# Patient Record
Sex: MALE | Race: WHITE | NOT HISPANIC OR LATINO | Employment: OTHER | URBAN - METROPOLITAN AREA
[De-identification: names, ages, dates, MRNs, and addresses within clinical notes are randomized per-mention and may not be internally consistent; named-entity substitution may affect disease eponyms.]

---

## 2022-01-01 ENCOUNTER — PATIENT OUTREACH (OUTPATIENT)
Dept: ONCOLOGY | Facility: CLINIC | Age: 82
End: 2022-01-01

## 2022-01-01 ENCOUNTER — PATIENT OUTREACH (OUTPATIENT)
Dept: CARE COORDINATION | Facility: CLINIC | Age: 82
End: 2022-01-01

## 2022-01-01 ENCOUNTER — ONCOLOGY VISIT (OUTPATIENT)
Dept: ONCOLOGY | Facility: CLINIC | Age: 82
End: 2022-01-01
Attending: INTERNAL MEDICINE
Payer: MEDICARE

## 2022-01-01 ENCOUNTER — ALLIED HEALTH/NURSE VISIT (OUTPATIENT)
Dept: ONCOLOGY | Facility: CLINIC | Age: 82
End: 2022-01-01

## 2022-01-01 ENCOUNTER — TRANSCRIBE ORDERS (OUTPATIENT)
Dept: ONCOLOGY | Facility: CLINIC | Age: 82
End: 2022-01-01

## 2022-01-01 ENCOUNTER — LAB (OUTPATIENT)
Dept: LAB | Facility: CLINIC | Age: 82
End: 2022-01-01
Attending: INTERNAL MEDICINE
Payer: MEDICARE

## 2022-01-01 ENCOUNTER — HEALTH MAINTENANCE LETTER (OUTPATIENT)
Age: 82
End: 2022-01-01

## 2022-01-01 ENCOUNTER — PRE VISIT (OUTPATIENT)
Dept: ONCOLOGY | Facility: CLINIC | Age: 82
End: 2022-01-01

## 2022-01-01 ENCOUNTER — LAB REQUISITION (OUTPATIENT)
Dept: LAB | Facility: CLINIC | Age: 82
End: 2022-01-01
Payer: MEDICARE

## 2022-01-01 VITALS
TEMPERATURE: 98 F | WEIGHT: 188.8 LBS | OXYGEN SATURATION: 96 % | DIASTOLIC BLOOD PRESSURE: 71 MMHG | HEIGHT: 70 IN | BODY MASS INDEX: 27.03 KG/M2 | RESPIRATION RATE: 16 BRPM | HEART RATE: 70 BPM | SYSTOLIC BLOOD PRESSURE: 112 MMHG

## 2022-01-01 DIAGNOSIS — C61 PROSTATE CA (H): Primary | ICD-10-CM

## 2022-01-01 DIAGNOSIS — C61 PROSTATE CANCER (H): Primary | ICD-10-CM

## 2022-01-01 DIAGNOSIS — C61 HORMONE RESISTANT PROSTATE CANCER (H): ICD-10-CM

## 2022-01-01 DIAGNOSIS — Z19.2 HORMONE RESISTANT PROSTATE CANCER (H): ICD-10-CM

## 2022-01-01 DIAGNOSIS — C79.51 PROSTATE CANCER METASTATIC TO BONE (H): ICD-10-CM

## 2022-01-01 DIAGNOSIS — C61 PROSTATE CANCER METASTATIC TO BONE (H): ICD-10-CM

## 2022-01-01 LAB
PATH REPORT.COMMENTS IMP SPEC: ABNORMAL
PATH REPORT.COMMENTS IMP SPEC: ABNORMAL
PATH REPORT.COMMENTS IMP SPEC: YES
PATH REPORT.FINAL DX SPEC: ABNORMAL
PATH REPORT.GROSS SPEC: ABNORMAL
PATH REPORT.MICROSCOPIC SPEC OTHER STN: ABNORMAL
PATH REPORT.RELEVANT HX SPEC: ABNORMAL
PATH REPORT.RELEVANT HX SPEC: ABNORMAL
PATH REPORT.SITE OF ORIGIN SPEC: ABNORMAL

## 2022-01-01 PROCEDURE — 88321 CONSLTJ&REPRT SLD PREP ELSWR: CPT | Performed by: PATHOLOGY

## 2022-01-01 PROCEDURE — G0463 HOSPITAL OUTPT CLINIC VISIT: HCPCS

## 2022-01-01 PROCEDURE — 99205 OFFICE O/P NEW HI 60 MIN: CPT | Performed by: STUDENT IN AN ORGANIZED HEALTH CARE EDUCATION/TRAINING PROGRAM

## 2022-01-01 RX ORDER — PREDNISONE 5 MG/1
TABLET ORAL
COMMUNITY
Start: 2022-01-01

## 2022-01-01 RX ORDER — NITROGLYCERIN 0.4 MG/1
TABLET SUBLINGUAL
COMMUNITY
Start: 2021-11-15

## 2022-01-01 RX ORDER — WARFARIN SODIUM 2 MG/1
TABLET ORAL
COMMUNITY
Start: 2022-01-01

## 2022-01-01 RX ORDER — ONDANSETRON 8 MG/1
8 TABLET, FILM COATED ORAL
Status: CANCELLED | OUTPATIENT
Start: 2022-01-01

## 2022-01-01 RX ORDER — EPINEPHRINE 1 MG/ML
0.3 INJECTION, SOLUTION INTRAMUSCULAR; SUBCUTANEOUS EVERY 5 MIN PRN
Status: CANCELLED | OUTPATIENT
Start: 2022-01-01

## 2022-01-01 RX ORDER — MEPERIDINE HYDROCHLORIDE 25 MG/ML
25 INJECTION INTRAMUSCULAR; INTRAVENOUS; SUBCUTANEOUS EVERY 30 MIN PRN
Status: CANCELLED | OUTPATIENT
Start: 2022-01-01

## 2022-01-01 RX ORDER — METOPROLOL SUCCINATE 100 MG/1
TABLET, EXTENDED RELEASE ORAL
COMMUNITY
Start: 2022-01-01

## 2022-01-01 RX ORDER — METHYLPREDNISOLONE SODIUM SUCCINATE 125 MG/2ML
125 INJECTION, POWDER, LYOPHILIZED, FOR SOLUTION INTRAMUSCULAR; INTRAVENOUS
Status: CANCELLED
Start: 2022-01-01

## 2022-01-01 RX ORDER — PROCHLORPERAZINE MALEATE 5 MG
5 TABLET ORAL EVERY 6 HOURS PRN
Status: CANCELLED
Start: 2022-01-01

## 2022-01-01 RX ORDER — LORAZEPAM 2 MG/ML
.5-1 INJECTION INTRAMUSCULAR EVERY 6 HOURS PRN
Status: CANCELLED | OUTPATIENT
Start: 2022-01-01

## 2022-01-01 RX ORDER — LORAZEPAM 0.5 MG/1
.5-1 TABLET ORAL EVERY 6 HOURS PRN
Status: CANCELLED
Start: 2022-01-01

## 2022-01-01 RX ORDER — DIPHENHYDRAMINE HYDROCHLORIDE 50 MG/ML
50 INJECTION INTRAMUSCULAR; INTRAVENOUS
Status: CANCELLED
Start: 2022-01-01

## 2022-01-01 RX ORDER — ATORVASTATIN CALCIUM 40 MG/1
40 TABLET, FILM COATED ORAL AT BEDTIME
COMMUNITY
Start: 2022-01-01

## 2022-01-01 RX ORDER — ALBUTEROL SULFATE 0.83 MG/ML
2.5 SOLUTION RESPIRATORY (INHALATION)
Status: CANCELLED | OUTPATIENT
Start: 2022-01-01

## 2022-01-01 RX ORDER — ALBUTEROL SULFATE 90 UG/1
1-2 AEROSOL, METERED RESPIRATORY (INHALATION)
Status: CANCELLED
Start: 2022-01-01

## 2022-01-01 ASSESSMENT — PAIN SCALES - GENERAL: PAINLEVEL: NO PAIN (0)

## 2022-10-14 NOTE — TELEPHONE ENCOUNTER
Action 2022 11:06 AM ABT   Action Taken Slides from [facility] received and taken to 5th floor path lab for review.    slides not available.     Imaging Received  2022 2:58 PM ABT   Action: Images from [facility] received and resolved to PACS.     RECORDS STATUS - ALL OTHER DIAGNOSIS      RECORDS RECEIVED FROM: [facility]   DATE RECEIVED: 10/20/22   NOTES STATUS DETAILS   OFFICE NOTE from referring provider [facility] 10/14/22: Dr. Gaston Romero   OFFICE NOTE from medical oncologist [facility] 10/14/22: Dr. Gaston Romero   OFFICE NOTE from other specialist [facility] 18: Dr. Bari Hung   DISCHARGE SUMMARY from hospital [facility] 09/10/14: [facility]   OPERATIVE REPORT [facility] 07: Radical retropubic prostatectomy   MEDICATION LIST [facility]     PATHOLOGY REPORTS Req 10/16-[facility]  [employer] 07: [id number]  07: [id number]  06: [id number]   ANYTHING RELATED TO DIAGNOSIS [facility] Most recent 10/14/22   IMAGING (NEED IMAGES & REPORT)     CT SCANS PACS 07: CT Abd Pel   XRAYS PACS 19: DXA   09/29/15-14: XR Pelvis   NM PACS 12, 07: NM Bone Scan   PET PACS 22-21: PET CT Skull

## 2022-10-14 NOTE — PROGRESS NOTES
New Patient Oncology Nurse Navigator Note     Referring provider: Gaston Romero MD PhD, [facility]      Referred to (specialty): Medical Oncology    Requested provider (if applicable): See Dr. Blas        Date Referral Received: 10/14/22     Evaluation for : consideration of the PSMA luteium therapeutic radionucleotide-   Prostate Cancer     Clinical History (per Nurse review of records provided):     Initial diagnosis of prostate cancer 2007     Per email referral from Dr. Romero dated 10/14/22    I assumed Mr. Mcwilliams's care 2 or 3 years ago from one of my colleagues.  He has metastatic castrate resistant prostate cancer.  He is presently receiving carboplatin and cabazitaxel, due to receive his sixth cycle today.  Prior therapies have included docetaxel as well as enzalutamide.  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  Not surprisingly, he did not exhibit much response to olaparib.  His present functional status is ECOG 1.  He underwent a PSMA PET scan on September 8 which did show multiple areas of abnormal avidity including mediastinal lymph nodes as well as multiple bone metastases as well as a right lung lesion.  His last biopsy was in August 2021 which was performed via endobronchial ultrasound with a lymph node at station 12 R showing malignant cells consistent with metastatic prostate adenocarcinoma.  Thus, the most recent imaging I believe all reflects metastatic prostate cancer as opposed to considering that he has a new, unrelated lung cancer.  The PSA drawn today was 550.  This level is rising.      I have talked to Mr. Mcwilliams and his wife about the need to travel to [place] and also the lead time necessary in order to secure PSMA luteum, if he is deemed an appropriate candidate.  I think there is a variety of things that we can do from a chemotherapy perspective to hold his  disease at bay prior to receiving the therapeutic radionucleotide.      All of his clinical information is available in [facility] via [facility] everywhere.  I did place a referral today.  As always, I thank you in advance for seeing Mr. Mcwilliams.  Please contact me if you have any questions.    Records Location ([facility], [place] Everywhere, Media, etc.):   [facility]      Records Needed:   [facility] imaging/path/per protocol    I called to discuss referral with Kalin and reached he and his wife in the car.  I reviewed with them my role and reason for my call.  I discussed with them what to expect at this consult with Dr. Blas.  I offered them a visit for 10/31/22, but they did not want to be in the [facility] on [facility] and prefer 11/7/22.  They did not have anything to write with.  I let them know I will call them back in 20 minutes to give them phone numbers and clinic location when they get home.    Current plan is for him to see Dr. Blas on 11/7/22, for consult, [place] [facility] 11[place]30 [place] arrival.    1530  I called Kalin back and they were able tor write all of the information down.  They had many questions about the consult and treatment.  I answered them to the best of my ability, but deferred many of the specifics regarding Pluvicto dosing/treatment plan to their visit with Dr. Blas.  They have no other questions at this time.  I warm transferred them to new patient scheduling to finalize appointment on 11/7/22.

## 2022-11-06 NOTE — PROGRESS NOTES
[facility] Medical Oncology Second Opinion Consultation Note       Date of visit: November 7, 2022    Dear Dr. Gaston Romero, thank you for referring your patient for a Second Opinion consultation at the [facility].  A brief overview of his oncological history as well as my recommendations follow below.    CC: referral for consideration of Pluvicto therapy for mCRPC with prior taxane and novel ART.      HPI: Kalin presents with his wife and son today for second opinion regarding possible Pluvicto administration for his metastatic castration resistant prostate cancer which is been previously treated with a taxane and a novel ART.  He is noting intermittent low back pain that comes and goes and lasts for about 15 minutes total when it does happen.  It is getting worse over the past few weeks.  He still remains pretty active around the house doing a lot of things and keeping himself busy.  His appetite has been good.  He is tolerated carboplatin and cabazitaxel well overall and recently received his sixth infusion.  Unfortunately his PSA has risen recently up to nearly 650 while on carboplatin cabazitaxel.  He has no nausea or vomiting.  He does note some mild urinary retention and urinary incontinence.  He otherwise feels pretty well all considering.     ONCOLOGY HISTORY (Adapted from [facility] notes:  11/13/06-PSA 7.1.  No prior.    11/30/06-Transrectal bx with no evidence of malignancy in 6 cores.   4/30/07-PSA 6.95  5/17/07-Transrectal bx with Naldo 5+4=9 on left.  Overread from N 4+5=9.    7/2/07-Radical prostatectomy.  Bilateral involvement with Hubbard 4+3=7, tertiary 5.  Margin+ at apex.  R SVI. 1/5 nodes positive.    9/17/07-Post op PSA 0.07  10/11/07-Salvage XRT 47.5 Gy in surgical bed, 9.5 Gy to pelvic nodes.  3 months lupron.   2/08-PSA 0.01  2/09-PSA 0.09  11/09-PSA 0.25  11/20-PSA 0.50  5/11-PSA 0.89  4/12-PSA 1.7  1/13-PSA  3.7  3/7/13-Transfer to Dr. Davidson at [place].  PSA 5.4. C-11 Choline PET with met at L1.  ADT with casodex.  SBRT to bone met declined.    9/12/13-PSA 0.3 on ADT alone  11/19/15-PSA unknown.  Repeat PET negative.   2/16-PSA 0.07   1/17-PSA 0.15  4/17-Repeat PET still negative   8/17-Transfer care back to [facility].    11/17-PSA 0.26  1/18-PSA 0.43  5/18-PSA 0.69  8/18-PSA 0.75, castration resistant disease  8/16/18-Start enzalutamide.  PSADT <6 months.    2/13/19-PSA bruno of 0.31 on enzalutamide  10/2020-Stop enzalutamide.  PSA 2.26  9/21-Start olaparib.  10/12/21-PSA 59.01  11/9/21-PSA 61.87  1/4/22-.14  2/1/22-.45  3/1/22-.18  3/29/22-Stop olaparib.  No response.   4/26/22-Start docetaxel. .23. Minimal PSA response. Bruno of 443 at C3D1.  6/28/22-Last cycle of docetaxel (3rd). Stopped due to rising PSA of 628.21.   7/2022-Start Carboplatin/cabazitaxel.  7/22/22-  8/12/22-  9/2/22-  9/23/22-  10/14/22-.03-C6 carbo/cabazitaxel  11/7/22-Second opinion at [facility] for Pluvicto.  PSA at [facility] prior was 650.    GENOMIC STUDIES:   CDK12 frameshift.  Genomic testing report pending transfer to [facility].    TREATMENT HISTORY:   ADT   Carbo (AUC 2) /cabazitaxel 20mg/m2 for 6 total doses (last 10/14/22)  olaparib 9/20/21-3/29/22  Enzalutamide 8/2018-10/2020  Docetaxel 4/26/22-6/28/22  zometa    GUIDELINES USED: N[facility]N    INTENT OF THERAPY:  Palliative.  Discussed at 11/7/2022 appointment.      CLINICAL TRIALS:   -KLK2-CART: no slots until at least late January at earliest for apheresis.   -PSMA-CD3: pending open, eligible.   -Francesca CTC: eligible, will discuss on 11/7/22  -NATHAN: ineligible, 2 taxanes in [place]  -ECLIPSE: [facility] with prior beverly and taxane x2.   -PSMA ADDITION: Pending opening, [facility] already.      PMH:  Urinary retention  afib with RVR  CAD  History of NSTEMI  HLD  Osteoarthritis  Vertigo   Osteoarthritis of hip  Asbestos pleural  "plaques   Metastatic, castration-resistant prostate cancer     PSH:  Radical prostatecomy 2007-[facility]     FAMILY HX:   No known family numbers with prostate cancer.    SOCIAL:   Former smoker, 15 PYH.  1 PPD x15 years.  Quit 11/13/76.   Never smokeless tobacco user.   No EtOH  No recreational drugs.   Herbs/supplements-none other than on medication list.  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    ALLERGIES: IV contrast, requiring premedication.    MEDS:  Warfarin  Albuterol nebulizer Q6H PRN dyspnea  Nitroglycerin 0.4mg PRN chest pain  Multivitamin daily   Vitamin D 1000 units daily   Atorvastatin 40mg daily   toprol XL 100mg daily     ROS-Remainder of 14 point ROS reviewed and negative except as in HPI.    PHYSICAL EXAM:  ECOG-PS=1    /71 (BP Location: Right arm, Patient Position: Sitting, Cuff Size: Adult Regular)   Pulse 70   Temp 98  F (36.7  C) (Oral)   Resp 16   Ht 1.778 m (5' 10\")   Wt 85.6 kg (188 lb 12.8 oz)   SpO2 96%   BMI 27.09 kg/m    Exam:  Constitutional: alert and no distress  Head: Normocephalic. No masses, lesions, or abnormalities grossly. Anicteric sclerae.   Neck: Neck supple. No gross adenopathy.  Trachea midline.    ENT: MMM,   Cardiovascular: negative, No edema or JVD.  Respiratory: negative, normal WOB on RA, no audible wheezing or rhonchi  Gastrointestinal: negative, soft, non-distended.   : Deferred  Musculoskeletal: extremities normal- no gross deformities noted and normal muscle tone  Skin: no suspicious lesions or rashes on exposed areas of skin   Neurologic: CNII-XII grossly intact, normal speech.    Psychiatric: mentation appears normal and affect normal/bright  Hematologic/Lymphatic/Immunologic: Negative    LABS AND IMAGES:    PSA trend, see oncology history.     PSMA PET from [facility].  Reviewed personally.  Diffuse uptake. SUV not available, but intense uptake at areas of uptake in upper chest, less intense in spine.  Multiple sites of uptake " in neck/chest and spine.      10/14/22 Labs from [facility]: WBC 10.2, ANC 7.2, Hgb 10.9, Plt 323.      IMPRESSION AND PLAN:    # metastatic, castration-resistant prostate cancer   Kalin presents today for second opinion regarding possible Pluvicto eligibility for his metastatic castration resistant prostate cancer.  He was initially diagnosed with Naldo 5+4 = 9 (4+5 = 9 on [facility] over read) in 2017.  He subsequently underwent radical prostatectomy with Charlotte 4+3 equal 7, and positive margin at the apex.  His PSA postop nadired at 0.07, and he subsequently underwent salvage radiation therapy to the prostate bed without androgen deprivation therapy.  From approximately 20 13-20 18 he was followed with Dr. Davidson at the [facility] and he received Casodex which resulted in a negative repeat C18 choline PET on multiple occasions.  He was eventually treated in 2018 with enzalutamide due to development of castration resistant disease.  He was treated with enzalutamide until approximately October 2020 which was discontinued due to rising PSA.  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  There was some question whether either enzalutamide or olaparib may have caused his myocardial infarction and thrombus, however he was co-infected with COVID-19 at the same time in December 2021.  He began docetaxel single agent in April 2022 and continue for 3 cycles.  This was discontinued due to persistent rise of his PSA throughout therapy.  He was then subsequently started on carboplatin and cabazitaxel which resulted in interval improvement of his PSA down to 399, however it began to rise while on therapy as well.  His most recent PSA is 630 as of the first week of November 2022.  He underwent a PSMA PET at [facility] which was notable for diffuse PSMA avid disease, particularly in the neck, chest, and some spinal locations as well.  He meets  the requirements to receive Pluvicto in terms of prior therapy as he has been treated with enzalutamide, and to taxanes.  His blood counts are adequate with an ANC most recently of 5.7 on 11/4/2022 at [facility].  Platelets at the same time were 270 and hemoglobin was 10.7.  His renal function was at baseline at 0.81 for creatinine and his LFTs were all within normal limits.    We spent most of the today's appointment discussing eligibility for Pluvicto, and the rationale for treatment with Pluvicto.  We discussed that Pluvicto is a radioligand targeting PSMA expressed on the surface of prostate cancer cells.  We discussed that not all prostate cancer cells express PSMA therefore there is a need for PSMA PET evaluation, which she has completed.  We discussed that his PSA level is relatively high although he is eligible for Pluvicto, he may not see as remarkable of benefit as some patients in the VISION trial which led to FDA approval in [place] had.  He does seem to have a high level of PSMA avidity which typically results in a better response, however I do not have the mean SUV available from the pushed imaging studies.  There is no minimum SUV requirement, although patients with a mean SUV of greater than 20 typically experience better responses according to literature.  We discussed the predominant side effects of Pluvicto, including nausea, vomiting, fatigue, cytopenias including thrombocytopenia, anemia and neutropenia.  We discussed that there is a rare but serious side effect of bone marrow failure with Pluvicto use.  We also discussed that patients may experience on target off organ effect due to PSM expression and lacrimal, salivary glands resulting in significant dry mouth that is not relieved with any available therapies.  We discussed that this is typically in patients receiving later doses, often 4-6 but can happen at any time.    Importantly he does have some urinary retention which we will  have to monitor in addition to urinary incontinence which will be important in terms of cleaning and radioactive contamination while he is receiving therapy.  I have noted this in my communications with the nuclear medicine team so they are aware.  He also has an allergy to iodinated contrast which I have passed on to the nuclear medicine team in order to make sure this is not a contraindication.    I discussed with Kalin and his family that Pluvicto consist of 6 treatments 6 weeks apart, and that labs must be done 1 week prior in order to place the order for Pluvicto as this must be manufactured and then shipped immediately to the administering site.  I discussed that Pluvicto is typically given on Wednesdays or Thursdays at [facility] and there cannot be delays in administration of this dose, unlike other therapeutic agents.  This is due to radioactive decay of the active lutetium 177.  We did discuss that in terms of planning if we order a dose we need to make sure he is able to get to [facility] to receive his dose as well as it is approximately $50,000 per dose.  We also discussed that he will have to sleep in a separate bedroom for approximately 3 days, remain away from children by 3 feet for 7 days, and away from pregnant women for 14 days.  He will get additional counseling through nuclear medicine when he is scheduled.    Right now given his adequate marrow counts I believe it is best to pursue Pluvicto first assuming we can get insurance approval and his contrast allergy does not preclude him from receiving this medication.  He does have a CDK 12 mutation as well, which was noted to be a frameshift mutation, although I do not have the report available.  With this he also may be eligible for pembrolizumab in the future but I would save this for later as Pluvicto is very dependent on having adequate bone marrow reserve.  This will be something that can be locally administered for him  in [place] should the need arise.    I discussed that I will communicate the plan with Dr. Romero after I hear back from our nuclear medicine and specialty therapy finance team.  I discussed that there is approximately 6-week delay in receiving the first dose due to the high demand although there has been as low as 2 weeks recently.  I do not have a good way of predicting how long the delay for his first dose will be for Pluvicto.    I would like to see him back prior to his second dose, or if there is prolonged delay getting his first dose as a virtual visit prior to his first dose to ensure he is still doing well.    PLAN:    1. I will put in a request to get Pluvicto approved for you today.   2. It typically takes 2-6 weeks to get a dose ready from the factory.   3. You will need blood counts 7 days before your scheduled Pluvicto dose to ensure you are safe to receive the medication.  You will need to get this done before every dose.    4. You may also be eligible for Pembrolizumab (an immune therapy) based on your CDK12 mutation as well, but this has a lower response rate.  This could be used in the future.   5. I will see you for a video visit before your second dose of Pluvicto to see how things are going.  If it is a long time before we can get you Pluvicto I will see you before your first dose.      A total of 80 minutes were spent on this patient on the day of the encounter, of which more than 50% of this time was used for counseling and coordination of care.  The patient and were given the opportunity to ask multiple questions today, all of which were answered to their satisfaction.    Raj Blas MD, PhD   of Medicine   Oncology/BMT/Cellular Therapies

## 2022-11-07 PROBLEM — C61 PROSTATE CANCER METASTATIC TO BONE (H): Status: ACTIVE | Noted: 2022-01-01

## 2022-11-07 PROBLEM — C61 HORMONE RESISTANT PROSTATE CANCER (H): Status: ACTIVE | Noted: 2022-01-01

## 2022-11-07 PROBLEM — Z19.2 HORMONE RESISTANT PROSTATE CANCER (H): Status: ACTIVE | Noted: 2022-01-01

## 2022-11-07 PROBLEM — C79.51 PROSTATE CANCER METASTATIC TO BONE (H): Status: ACTIVE | Noted: 2022-01-01

## 2022-11-07 NOTE — NURSING NOTE
Chief Complaint   Patient presents with     Blood Draw     VPT blood draw by lab RN   Research drawn and research coordinator took tubes of blood  Melanie Melendez RN

## 2022-11-07 NOTE — LETTER
11/7/2022         RE: Kalin Mcwilliams  [address]  [place] [address]        Dear Colleague,    Thank you for referring your patient, Kalin Mcwilliams, to the [facility]. Please see a copy of my visit note below.      [facility] Medical Oncology Second Opinion Consultation Note       Date of visit: November 7, 2022    Dear Dr. Gaston Romero, thank you for referring your patient for a Second Opinion consultation at the [facility].  A brief overview of his oncological history as well as my recommendations follow below.    CC: referral for consideration of Pluvicto therapy for mCRPC with prior taxane and novel ART.      HPI: Kalin presents with his wife and son today for second opinion regarding possible Pluvicto administration for his metastatic castration resistant prostate cancer which is been previously treated with a taxane and a novel ART.  He is noting intermittent low back pain that comes and goes and lasts for about 15 minutes total when it does happen.  It is getting worse over the past few weeks.  He still remains pretty active around the house doing a lot of things and keeping himself busy.  His appetite has been good.  He is tolerated carboplatin and cabazitaxel well overall and recently received his sixth infusion.  Unfortunately his PSA has risen recently up to nearly 650 while on carboplatin cabazitaxel.  He has no nausea or vomiting.  He does note some mild urinary retention and urinary incontinence.  He otherwise feels pretty well all considering.     ONCOLOGY HISTORY (Adapted from [facility] notes:  11/13/06-PSA 7.1.  No prior.    11/30/06-Transrectal bx with no evidence of malignancy in 6 cores.   4/30/07-PSA 6.95  5/17/07-Transrectal bx with Melrose 5+4=9 on left.  Overread from [facility] 4+5=9.    7/2/07-Radical prostatectomy.  Bilateral involvement with Melrose 4+3=7, tertiary 5.  Margin+ at apex.  R SVI. 1/5  nodes positive.    9/17/07-Post op PSA 0.07  10/11/07-Salvage XRT 47.5 Gy in surgical bed, 9.5 Gy to pelvic nodes.  3 months lupron.   2/08-PSA 0.01  2/09-PSA 0.09  11/09-PSA 0.25  11/20-PSA 0.50  5/11-PSA 0.89  4/12-PSA 1.7  1/13-PSA 3.7  3/7/13-Transfer to Dr. Davidson at [facility].  PSA 5.4. C-11 Choline PET with met at L1.  ADT with casodex.  SBRT to bone met declined.    9/12/13-PSA 0.3 on ADT alone  11/19/15-PSA unknown.  Repeat PET negative.   2/16-PSA 0.07   1/17-PSA 0.15  4/17-Repeat PET still negative   8/17-Transfer care back to [facility].    11/17-PSA 0.26  1/18-PSA 0.43  5/18-PSA 0.69  8/18-PSA 0.75, castration resistant disease  8/16/18-Start enzalutamide.  PSADT <6 months.    2/13/19-PSA bruno of 0.31 on enzalutamide  10/2020-Stop enzalutamide.  PSA 2.26  9/21-Start olaparib.  10/12/21-PSA 59.01  11/9/21-PSA 61.87  1/4/22-.14  2/1/22-.45  3/1/22-.18  3/29/22-Stop olaparib.  No response.   4/26/22-Start docetaxel. .23. Minimal PSA response. Bruno of 443 at C3D1.  6/28/22-Last cycle of docetaxel (3rd). Stopped due to rising PSA of 628.21.   7/2022-Start Carboplatin/cabazitaxel.  7/22/22-  8/12/22-  9/2/22-  9/23/22-  10/14/22-.03-C6 carbo/cabazitaxel  11/7/22-Second opinion at [facility] for Pluvicto.  PSA at [facility] week prior was 650.    GENOMIC STUDIES:   CDK12 frameshift.  Genomic testing report pending transfer to [facility].    TREATMENT HISTORY:   ADT   Carbo (AUC 2) /cabazitaxel 20mg/m2 for 6 total doses (last 10/14/22)  olaparib 9/20/21-3/29/22  Enzalutamide 8/2018-10/2020  Docetaxel 4/26/22-6/28/22  zometa    GUIDELINES USED: NCCN    INTENT OF THERAPY:  Palliative.  Discussed at 11/7/2022 appointment.      CLINICAL TRIALS:   -KLK2-CART: no slots until at least late January at earliest for apheresis.   -PSMA-CD3: pending open, eligible.   -Francesca CTC: eligible, will discuss on 11/7/22  -NATHAN: ineligible, 2 taxanes in [place]  [place]state  -ECLIPSE: CRPC with prior beverly and taxane x2.   -PSMA ADDITION: Pending opening, CRPC already.      PMH:  Urinary retention  afib with RVR  CAD  History of NSTEMI  HLD  Osteoarthritis  Vertigo   Osteoarthritis of hip  Asbestos pleural plaques   Metastatic, castration-resistant prostate cancer     PSH:  Radical prostatecomy 2007-[facility]     FAMILY HX:   No known family numbers with prostate cancer.    SOCIAL:   Former smoker, 15 PYH.  1 PPD x15 years.  Quit 11/13/76.   Never smokeless tobacco user.   No EtOH  No recreational drugs.   Herbs/supplements-none other than on medication list.  Occupation-worked in [employer] in [employer].    ALLERGIES: IV contrast, requiring premedication.    MEDS:  Warfarin  Albuterol nebulizer Q6H PRN dyspnea  Nitroglycerin 0.4mg PRN chest pain  Multivitamin daily   Vitamin D 1000 units daily   Atorvastatin 40mg daily   toprol XL 100mg daily     ROS-Remainder of 14 point ROS reviewed and negative except as in HPI.    PHYSICAL EXAM:  ECOG-PS=1    /71 (BP Location: Right arm, Patient Position: Sitting, Cuff Size: Adult Regular)   Pulse 70   Temp 98  F (36.7  C) (Oral)   Resp 16   Ht 1.778 m (5' 10\")   Wt 85.6 kg (188 lb 12.8 oz)   SpO2 96%   BMI 27.09 kg/m    Exam:  Constitutional: alert and no distress  Head: Normocephalic. No masses, lesions, or abnormalities grossly. Anicteric sclerae.   Neck: Neck supple. No gross adenopathy.  Trachea midline.    ENT: MMM,   Cardiovascular: negative, No edema or JVD.  Respiratory: negative, normal WOB on RA, no audible wheezing or rhonchi  Gastrointestinal: negative, soft, non-distended.   : Deferred  Musculoskeletal: extremities normal- no gross deformities noted and normal muscle tone  Skin: no suspicious lesions or rashes on exposed areas of skin   Neurologic: CNII-XII grossly intact, normal speech.    Psychiatric: mentation appears normal and affect normal/bright  Hematologic/Lymphatic/Immunologic: Negative    LABS " AND IMAGES:    PSA trend, see oncology history.     PSMA PET from [facility].  Reviewed personally.  Diffuse uptake. SUV not available, but intense uptake at areas of uptake in upper chest, less intense in spine.  Multiple sites of uptake in neck/chest and spine.      10/14/22 Labs from [facility]: WBC 10.2, ANC 7.2, Hgb 10.9, Plt 323.      IMPRESSION AND PLAN:    # metastatic, castration-resistant prostate cancer   Kalin presents today for second opinion regarding possible Pluvicto eligibility for his metastatic castration resistant prostate cancer.  He was initially diagnosed with Naldo 5+4 = 9 (4+5 = 9 on [facility] over read) in 2017.  He subsequently underwent radical prostatectomy with [place] 4+3 equal 7, and positive margin at the apex.  His PSA postop nadired at 0.07, and he subsequently underwent salvage radiation therapy to the prostate bed without androgen deprivation therapy.  From approximately 20 13-20 18 he was followed with Dr. Davidson at the [facility] and he received Casodex which resulted in a negative repeat C18 choline PET on multiple occasions.  He was eventually treated in 2018 with enzalutamide due to development of castration resistant disease.  He was treated with enzalutamide until approximately October 2020 which was discontinued due to rising PSA.  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  There was some question whether either enzalutamide or olaparib may have caused his myocardial infarction and thrombus, however he was co-infected with COVID-19 at the same time in December 2021.  He began docetaxel single agent in April 2022 and continue for 3 cycles.  This was discontinued due to persistent rise of his PSA throughout therapy.  He was then subsequently started on carboplatin and cabazitaxel which resulted in interval improvement of his PSA down to 399, however it began to rise while on therapy as  well.  His most recent PSA is 630 as of the first week of November 2022.  He underwent a PSMA PET at [facility] which was notable for diffuse PSMA avid disease, particularly in the neck, chest, and some spinal locations as well.  He meets the requirements to receive Pluvicto in terms of prior therapy as he has been treated with enzalutamide, and to taxanes.  His blood counts are adequate with an ANC most recently of 5.7 on 11/4/2022 at [facility].  Platelets at the same time were 270 and hemoglobin was 10.7.  His renal function was at baseline at 0.81 for creatinine and his LFTs were all within normal limits.    We spent most of the today's appointment discussing eligibility for Pluvicto, and the rationale for treatment with Pluvicto.  We discussed that Pluvicto is a radioligand targeting PSMA expressed on the surface of prostate cancer cells.  We discussed that not all prostate cancer cells express PSMA therefore there is a need for PSMA PET evaluation, which she has completed.  We discussed that his PSA level is relatively high although he is eligible for Pluvicto, he may not see as remarkable of benefit as some patients in the VISION trial which led to FDA approval in [place] had.  He does seem to have a high level of PSMA avidity which typically results in a better response, however I do not have the mean SUV available from the pushed imaging studies.  There is no minimum SUV requirement, although patients with a mean SUV of greater than 20 typically experience better responses according to literature.  We discussed the predominant side effects of Pluvicto, including nausea, vomiting, fatigue, cytopenias including thrombocytopenia, anemia and neutropenia.  We discussed that there is a rare but serious side effect of bone marrow failure with Pluvicto use.  We also discussed that patients may experience on target off organ effect due to PSM expression and lacrimal, salivary glands resulting in  significant dry mouth that is not relieved with any available therapies.  We discussed that this is typically in patients receiving later doses, often 4-6 but can happen at any time.    Importantly he does have some urinary retention which we will have to monitor in addition to urinary incontinence which will be important in terms of cleaning and radioactive contamination while he is receiving therapy.  I have noted this in my communications with the nuclear medicine team so they are aware.  He also has an allergy to iodinated contrast which I have passed on to the nuclear medicine team in order to make sure this is not a contraindication.    I discussed with Kalin and his family that Pluvicto consist of 6 treatments 6 weeks apart, and that labs must be done 1 week prior in order to place the order for Pluvicto as this must be manufactured and then shipped immediately to the administering site.  I discussed that Pluvicto is typically given on Wednesdays or Thursdays at [facility] and there cannot be delays in administration of this dose, unlike other therapeutic agents.  This is due to radioactive decay of the active lutetium 177.  We did discuss that in terms of planning if we order a dose we need to make sure he is able to get to [facility] to receive his dose as well as it is approximately $50,000 per dose.  We also discussed that he will have to sleep in a separate bedroom for approximately 3 days, remain away from children by 3 feet for 7 days, and away from pregnant women for 14 days.  He will get additional counseling through nuclear medicine when he is scheduled.    Right now given his adequate marrow counts I believe it is best to pursue Pluvicto first assuming we can get insurance approval and his contrast allergy does not preclude him from receiving this medication.  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  With this he also may be eligible for pembrolizumab in the future but I would save this for later as Pluvicto is very dependent on having adequate bone marrow reserve.  This will be something that can be locally administered for him in [place] should the need arise.    I discussed that I will communicate the plan with Dr. Romero after I hear back from our nuclear medicine and specialty therapy finance team.  I discussed that there is approximately 6-week delay in receiving the first dose due to the high demand although there has been as low as 2 weeks recently.  I do not have a good way of predicting how long the delay for his first dose will be for Pluvicto.    I would like to see him back prior to his second dose, or if there is prolonged delay getting his first dose as a virtual visit prior to his first dose to ensure he is still doing well.    PLAN:    1. I will put in a request to get Pluvicto approved for you today.   2. It typically takes 2-6 weeks to get a dose ready from the factory.   3. You will need blood counts 7 days before your scheduled Pluvicto dose to ensure you are safe to receive the medication.  You will need to get this done before every dose.    4. You may also be eligible for Pembrolizumab (an immune therapy) based on your CDK12 mutation as well, but this has a lower response rate.  This could be used in the future.   5. I will see you for a video visit before your second dose of Pluvicto to see how things are going.  If it is a long time before we can get you Pluvicto I will see you before your first dose.      A total of 80 minutes were spent on this patient on the day of the encounter, of which more than 50% of this time was used for counseling and coordination of care.  The patient and were given the opportunity to ask multiple questions today, all of which were answered to their satisfaction.        Again, thank you for allowing me to participate in the care of your  patient.      Sincerely,    Raj Blas MD

## 2022-11-08 NOTE — PROGRESS NOTES
I had a opportunity to meet Mr. Mcwilliams regarding the Astrin CTC study.     The consent form, including purpose, risks and benefits, was reviewed with , and all questions were answered before he signed the consent form. The patient consented and understands the study involves a one-time 50 mL blood draw.      I, Zohreh Ly, discussed the study with the patient, going through the informed consent form page by page and obtained consent. A copy of the signed form was provided to the patient. His wife and son were also present in the room. No procedures specific to this study were performed prior to the patient signing the consent form.     Elligibility was determined and signed off by Zohreh Ly prior to blood collection.       Consent and combined HIPAA Version Date: 16 AUG 22 [facility] IRB approval: 1 NOV 22      Consent and combined HIPAA obtained by: Zohreh Ly   Date: 7 NOV 22     Zohreh Ly

## 2022-11-16 NOTE — PROGRESS NOTES
Per patient's request, completed and faxed [facility] (Ph. [phone], F. [phone]) request for lodging dates 01/15/2023-01/16/2023. [email or web address] will contact patient for confirmation of reservation.  will continue to provide support as needed.    *Per spouse patient is having trouble accessing [employer]. They currently are not able to access any messages that come through. SW provided them with the phone number to [employer] Technical Assistance ([phone]).    AJIT Chapman,[facility]  Hematology/Oncology Social Worker  Phone:[phone] Pager: [phone]

## 2023-01-01 ENCOUNTER — HOSPITAL ENCOUNTER (OUTPATIENT)
Dept: NUCLEAR MEDICINE | Facility: CLINIC | Age: 83
Setting detail: NUCLEAR MEDICINE
Discharge: HOME | End: 2023-01-16
Attending: STUDENT IN AN ORGANIZED HEALTH CARE EDUCATION/TRAINING PROGRAM | Admitting: STUDENT IN AN ORGANIZED HEALTH CARE EDUCATION/TRAINING PROGRAM
Payer: MEDICARE

## 2023-01-01 ENCOUNTER — PATIENT OUTREACH (OUTPATIENT)
Dept: CARE COORDINATION | Facility: CLINIC | Age: 83
End: 2023-01-01

## 2023-01-01 ENCOUNTER — PATIENT OUTREACH (OUTPATIENT)
Dept: CARE COORDINATION | Facility: CLINIC | Age: 83
End: 2023-01-01
Payer: COMMERCIAL

## 2023-01-01 ENCOUNTER — VIRTUAL VISIT (OUTPATIENT)
Dept: ONCOLOGY | Facility: CLINIC | Age: 83
End: 2023-01-01
Attending: STUDENT IN AN ORGANIZED HEALTH CARE EDUCATION/TRAINING PROGRAM
Payer: COMMERCIAL

## 2023-01-01 DIAGNOSIS — I48.0 PAF (PAROXYSMAL ATRIAL FIBRILLATION) (H): Primary | ICD-10-CM

## 2023-01-01 DIAGNOSIS — Z19.2 HORMONE RESISTANT PROSTATE CANCER (H): ICD-10-CM

## 2023-01-01 DIAGNOSIS — C61 PROSTATE CANCER METASTATIC TO BONE (H): ICD-10-CM

## 2023-01-01 DIAGNOSIS — C79.51 PROSTATE CANCER METASTATIC TO BONE (H): Primary | ICD-10-CM

## 2023-01-01 DIAGNOSIS — C61 HORMONE RESISTANT PROSTATE CANCER (H): ICD-10-CM

## 2023-01-01 DIAGNOSIS — C61 PROSTATE CANCER METASTATIC TO BONE (H): Primary | ICD-10-CM

## 2023-01-01 DIAGNOSIS — I26.99 PULMONARY EMBOLISM, UNSPECIFIED CHRONICITY, UNSPECIFIED PULMONARY EMBOLISM TYPE, UNSPECIFIED WHETHER ACUTE COR PULMONALE PRESENT (H): ICD-10-CM

## 2023-01-01 DIAGNOSIS — C79.51 PROSTATE CANCER METASTATIC TO BONE (H): ICD-10-CM

## 2023-01-01 PROCEDURE — 344N000001 HC RX 344: Performed by: STUDENT IN AN ORGANIZED HEALTH CARE EDUCATION/TRAINING PROGRAM

## 2023-01-01 PROCEDURE — G0463 HOSPITAL OUTPT CLINIC VISIT: HCPCS | Mod: PN,GT | Performed by: STUDENT IN AN ORGANIZED HEALTH CARE EDUCATION/TRAINING PROGRAM

## 2023-01-01 PROCEDURE — 79101 NUCLEAR RX IV ADMIN: CPT | Mod: 26 | Performed by: RADIOLOGY

## 2023-01-01 PROCEDURE — 79101 NUCLEAR RX IV ADMIN: CPT

## 2023-01-01 PROCEDURE — A9607 HC RX 344: HCPCS | Performed by: STUDENT IN AN ORGANIZED HEALTH CARE EDUCATION/TRAINING PROGRAM

## 2023-01-01 PROCEDURE — 99417 PROLNG OP E/M EACH 15 MIN: CPT | Mod: VID | Performed by: STUDENT IN AN ORGANIZED HEALTH CARE EDUCATION/TRAINING PROGRAM

## 2023-01-01 PROCEDURE — 99215 OFFICE O/P EST HI 40 MIN: CPT | Mod: VID | Performed by: STUDENT IN AN ORGANIZED HEALTH CARE EDUCATION/TRAINING PROGRAM

## 2023-01-01 RX ORDER — PROCHLORPERAZINE MALEATE 5 MG
5 TABLET ORAL EVERY 6 HOURS PRN
Status: DISCONTINUED | OUTPATIENT
Start: 2023-01-01 | End: 2023-01-01 | Stop reason: HOSPADM

## 2023-01-01 RX ORDER — ALBUTEROL SULFATE 0.83 MG/ML
2.5 SOLUTION RESPIRATORY (INHALATION)
Status: DISCONTINUED | OUTPATIENT
Start: 2023-01-01 | End: 2023-01-01 | Stop reason: HOSPADM

## 2023-01-01 RX ORDER — LORAZEPAM 2 MG/ML
.5-1 INJECTION INTRAMUSCULAR EVERY 6 HOURS PRN
Status: CANCELLED | OUTPATIENT
Start: 2023-01-01

## 2023-01-01 RX ORDER — METHYLPREDNISOLONE SODIUM SUCCINATE 125 MG/2ML
125 INJECTION, POWDER, LYOPHILIZED, FOR SOLUTION INTRAMUSCULAR; INTRAVENOUS
Status: CANCELLED
Start: 2023-01-01

## 2023-01-01 RX ORDER — LORAZEPAM 2 MG/ML
.5-1 INJECTION INTRAMUSCULAR EVERY 6 HOURS PRN
Status: DISCONTINUED | OUTPATIENT
Start: 2023-01-01 | End: 2023-01-01 | Stop reason: HOSPADM

## 2023-01-01 RX ORDER — MEPERIDINE HYDROCHLORIDE 25 MG/ML
25 INJECTION INTRAMUSCULAR; INTRAVENOUS; SUBCUTANEOUS EVERY 30 MIN PRN
Status: CANCELLED | OUTPATIENT
Start: 2023-01-01

## 2023-01-01 RX ORDER — LORAZEPAM 0.5 MG/1
.5-1 TABLET ORAL EVERY 6 HOURS PRN
Status: CANCELLED
Start: 2023-01-01

## 2023-01-01 RX ORDER — DIPHENHYDRAMINE HYDROCHLORIDE 50 MG/ML
50 INJECTION INTRAMUSCULAR; INTRAVENOUS
Status: CANCELLED
Start: 2023-01-01

## 2023-01-01 RX ORDER — ALBUTEROL SULFATE 90 UG/1
1-2 AEROSOL, METERED RESPIRATORY (INHALATION)
Status: DISCONTINUED | OUTPATIENT
Start: 2023-01-01 | End: 2023-01-01 | Stop reason: HOSPADM

## 2023-01-01 RX ORDER — MEPERIDINE HYDROCHLORIDE 25 MG/ML
25 INJECTION INTRAMUSCULAR; INTRAVENOUS; SUBCUTANEOUS EVERY 30 MIN PRN
Status: DISCONTINUED | OUTPATIENT
Start: 2023-01-01 | End: 2023-01-01 | Stop reason: HOSPADM

## 2023-01-01 RX ORDER — DIPHENHYDRAMINE HYDROCHLORIDE 50 MG/ML
50 INJECTION INTRAMUSCULAR; INTRAVENOUS
Status: DISCONTINUED | OUTPATIENT
Start: 2023-01-01 | End: 2023-01-01 | Stop reason: HOSPADM

## 2023-01-01 RX ORDER — ALBUTEROL SULFATE 0.83 MG/ML
2.5 SOLUTION RESPIRATORY (INHALATION)
Status: CANCELLED | OUTPATIENT
Start: 2023-01-01

## 2023-01-01 RX ORDER — PROCHLORPERAZINE MALEATE 5 MG
5 TABLET ORAL EVERY 6 HOURS PRN
Status: CANCELLED
Start: 2023-01-01

## 2023-01-01 RX ORDER — ALBUTEROL SULFATE 90 UG/1
1-2 AEROSOL, METERED RESPIRATORY (INHALATION)
Status: CANCELLED
Start: 2023-01-01

## 2023-01-01 RX ORDER — METHYLPREDNISOLONE SODIUM SUCCINATE 125 MG/2ML
125 INJECTION, POWDER, LYOPHILIZED, FOR SOLUTION INTRAMUSCULAR; INTRAVENOUS
Status: DISCONTINUED | OUTPATIENT
Start: 2023-01-01 | End: 2023-01-01 | Stop reason: HOSPADM

## 2023-01-01 RX ORDER — EPINEPHRINE 1 MG/ML
0.3 INJECTION, SOLUTION, CONCENTRATE INTRAVENOUS EVERY 5 MIN PRN
Status: DISCONTINUED | OUTPATIENT
Start: 2023-01-01 | End: 2023-01-01 | Stop reason: HOSPADM

## 2023-01-01 RX ORDER — EPINEPHRINE 1 MG/ML
0.3 INJECTION, SOLUTION INTRAMUSCULAR; SUBCUTANEOUS EVERY 5 MIN PRN
Status: CANCELLED | OUTPATIENT
Start: 2023-01-01

## 2023-01-01 RX ORDER — ONDANSETRON 8 MG/1
8 TABLET, FILM COATED ORAL
Status: DISCONTINUED | OUTPATIENT
Start: 2023-01-01 | End: 2023-01-01 | Stop reason: HOSPADM

## 2023-01-01 RX ORDER — ONDANSETRON 8 MG/1
8 TABLET, FILM COATED ORAL
Status: CANCELLED | OUTPATIENT
Start: 2023-01-01

## 2023-01-01 RX ORDER — LORAZEPAM 0.5 MG/1
.5-1 TABLET ORAL EVERY 6 HOURS PRN
Status: DISCONTINUED | OUTPATIENT
Start: 2023-01-01 | End: 2023-01-01 | Stop reason: HOSPADM

## 2023-01-01 RX ADMIN — LUTETIUM LU 177 VIPIVOTIDE TETRAXETAN 200 MILLICURIE: 27 INJECTION, SOLUTION INTRAVENOUS at 09:16

## 2023-01-09 NOTE — TELEPHONE ENCOUNTER
Social Work Intervention  [facility]    Data/Intervention:    Patient Name:  Kalin Mcwilliams  /Age:  1940 (82 year old)    Visit Type: telephone  Referral Source: [facility]  Reason for Referral:  Questions about [facility]    Collaborated With:    -Patient and spouse    Psychosocial Information/Concerns:  Patient and Spouse have an upcoming [facility] stay 1/15/-. Requesting to speak with SW for additional questions.    Intervention/Education/Resources Provided:  SW called patient and spoke with spouse. Patient was present in the background. SW and spouse discussed [facility] stay and overview of the lodging. SW and spouse also discussed [facility] [employer] to get to and from appointments.     Assessment/Plan:  SW informed spouse that someone from [facility] will be calling this week with additional information about their stay. Spouse verbalized understanding and denied any additional questions at this time. SW will continue to remain available as needed. Provided patient/family with contact information and availability.    AJIT Chapman,[facility]  Hematology/Oncology Social Worker  Phone:[phone] Pager: [phone]

## 2023-01-16 NOTE — PROGRESS NOTES
[facility] Nursing Note:    Patient presents today for Pluvitco therapy, dose 1 of  6  Patient seen by provider today: Yes: Dr Poe   present during visit today: NOT APPLICABLE      Intravenous Access:  Peripheral IV placed     Treatment Conditions:  Labs done on 1/9/2023    Results reviewed, labs Met treatment parameters, ok to proceed with treatment.           Post Infusion Assessment:  Patient tolerated infusion without incident.    PIV - No evidence of extravasations, access discontinued per protocol.         Discharge Plan:   Patient will return as scheduled for next appointment.   Patient discharged at 1045  in stable condition accompanied by: wife and son  Departure Mode: Ambulatory and Ambulatory Assist (wheelchair)

## 2023-01-18 NOTE — PROGRESS NOTES
Per patient's request, completed and faxed [facility] (Ph. [phone], F. [phone]) request for lodging dates 02/26/2023-02/27/2023. [facility] will contact patient for confirmation of reservation.  will continue to provide support as needed.    AJIT Chapman,[facility]  Hematology/Oncology Social Worker  Phone:[phone] Pager: [phone]

## 2023-02-20 PROBLEM — I26.99 PULMONARY EMBOLISM, UNSPECIFIED CHRONICITY, UNSPECIFIED PULMONARY EMBOLISM TYPE, UNSPECIFIED WHETHER ACUTE COR PULMONALE PRESENT (H): Status: ACTIVE | Noted: 2023-01-01

## 2023-02-20 PROBLEM — I48.0 PAF (PAROXYSMAL ATRIAL FIBRILLATION) (H): Status: ACTIVE | Noted: 2023-01-01

## 2023-02-20 NOTE — LETTER
2/20/2023         RE: Kalin Mcwilliams  [address] [place] [address]        Dear Colleague,    Thank you for referring your patient, Kalin Mcwilliams, to the [facility]. Please see a copy of my visit note below.    Video-Visit Details    Type of service:  Video Visit    Video Start Time (time video started): 1:05    Video End Time (time video stopped): 1:37    Originating Location (pt. Location): Home     Distant Location (provider location):  On-site    Mode of Communication:  Video Conference via [facility] Follow-up Visit Note       Date of visit: February 20, 2023    CC: Follow-up after C1 Pluvicto, pending C2 Pluvicto.      Interval History: He is not feeling great.  He was seen in the ED for hypoxia and started himself back on his home O2 at 2LPM.  He is feeling somewhat short of breath and really isn't able to be active at all.  No pain currently.  Appetite has been OK.  Is wondering why his PSA isn't going down after dose #1 of Pluvicto, but still very interested in proceeding with dose #2.  No nausea with dose #1.  Fatigue is significant already so didn't notice an increase in this.  No dry mouth or dry eyes reported.      ONCOLOGY HISTORY (Adapted from [facility] notes):  11/13/06-PSA 7.1.  No prior.    11/30/06-Transrectal bx with no evidence of malignancy in 6 cores.   4/30/07-PSA 6.95  5/17/07-Transrectal bx with [place] 5+4=9 on left.  Overread from [facility] 4+5=9.    7/2/07-Radical prostatectomy.  Bilateral involvement with [place] 4+3=7, tertiary 5.  Margin+ at apex.  R SVI. 1/5 nodes positive.    9/17/07-Post op PSA 0.07  10/11/07-Salvage XRT 47.5 Gy in surgical bed, 9.5 Gy to pelvic nodes.  3 months lupron.   2/08-PSA 0.01  2/09-PSA 0.09  11/09-PSA 0.25  11/20-PSA 0.50  5/11-PSA 0.89  4/12-PSA 1.7  1/13-PSA 3.7  3/7/13-Transfer to Dr. Davidson at [place].  PSA 5.4. C-11 Choline PET with met at L1.  ADT with casodex.   SBRT to bone met declined.    9/12/13-PSA 0.3 on ADT alone  11/19/15-PSA unknown.  Repeat PET negative.   2/16-PSA 0.07   1/17-PSA 0.15  4/17-Repeat PET still negative   8/17-Transfer care back to [facility].    11/17-PSA 0.26  1/18-PSA 0.43  5/18-PSA 0.69  8/18-PSA 0.75, castration resistant disease  8/16/18-Start enzalutamide.  PSADT <6 months.    2/13/19-PSA bruno of 0.31 on enzalutamide  10/2020-Stop enzalutamide.  PSA 2.26  9/21-Start olaparib.  10/12/21-PSA 59.01  11/9/21-PSA 61.87  1/4/22-.14  2/1/22-.45  3/1/22-.18  3/29/22-Stop olaparib.  No response.   4/26/22-Start docetaxel. .23. Minimal PSA response. Bruno of 443 at C3D1.  6/28/22-Last cycle of docetaxel (3rd). Stopped due to rising PSA of 628.21.   7/2022-Start Carboplatin/cabazitaxel.  7/22/22-  8/12/22-  9/2/22-  9/23/22-  10/14/22-.03-C6 carbo/cabazitaxel  11/7/22-Second opinion at [facility] for Pluvicto.  PSA at [facility] week prior was 650.  12/2/22-  12/28/22-  1/9/23-  1/17/23-Pluvicto Dose #1.   1/23/23-PSA 1589  2/13/23-PSA 1746  2/20/23-Video visit prior to C2 Pluvicto.  More short of breath and on home O2 again (note prior rx for this over a year ago).  Discussed my concerns that the PSA is not going down, although trend is slowing down a bit.  We will have to see how he is doing prior to dose #3 and if PSA is going down.  If not declining after 2 doses, it is unlikely that additional doses will provide a benefit.      GENOMIC STUDIES:   CDK12 frameshift.      TREATMENT HISTORY:   ADT   Carbo (AUC 2) /cabazitaxel 20mg/m2 for 6 total doses (last 10/14/22)  olaparib 9/20/21-3/29/22  Enzalutamide 8/2018-10/2020  Docetaxel 4/26/22-6/28/22  zometa  Oral cytoxan    GUIDELINES USED: NCCN    INTENT OF THERAPY:  Palliative.  Discussed at 11/7/2022 appointment.      CLINICAL TRIALS:   -KLK2-CART: no slots until at least late January at earliest for apheresis.    -PSMA-CD3: pending open, eligible.   -Astrin CTC: eligible, will discuss on 11/7/22  -NATHAN: ineligible, 2 taxanes [place] [place]  -ECLIPSE: [facility] with prior beverly and taxane x2.   -PSMA ADDITION: Pending opening, [facility] already.      PMH:  Urinary retention  afib with RVR  CAD  History of NSTEMI  HLD  Osteoarthritis  Vertigo   Osteoarthritis of hip  Asbestos pleural plaques   Metastatic, castration-resistant prostate cancer   Pulmonary embolism, no chronic warfarin     PSH:  Radical prostatecomy 2007-[facility]     FAMILY HX:   No known family numbers with prostate cancer.    SOCIAL:   Former smoker, 15 PYH.  1 PPD x15 years.  Quit 11/13/76.   Never smokeless tobacco user.   No EtOH  No recreational drugs.   Herbs/supplements-none other than on medication list.  Occupation-worked in [employer] in [place].    ALLERGIES: IV contrast, requiring premedication.    MEDS:  Warfarin  Albuterol nebulizer Q6H PRN dyspnea  Nitroglycerin 0.4mg PRN chest pain  Multivitamin daily   Vitamin D 1000 units daily   Atorvastatin 40mg daily   toprol XL 100mg daily     ROS-Remainder of 14 point ROS reviewed and negative except as in HPI.    PHYSICAL EXAM:  ECOG-PS=1    There were no vitals taken for this visit.  GENERAL: alert and no distress  EYES: Eyes grossly normal to inspection.  No discharge or erythema, or obvious scleral/conjunctival abnormalities.  NECK: No asymmetry, visible masses or scars  RESP: No audible wheeze, cough, or visible cyanosis.  No visible retractions or increased work of breathing. On nasal cannula.    SKIN: Visible skin clear. No significant rash, abnormal pigmentation.  Bilateral forearms with ecchymoses in various states of healing.    NEURO: Cranial nerves grossly intact.  Mentation and speech appropriate for age.  PSYCH: Mentation appears normal, affect normal/bright, judgement and insight intact, normal speech and appearance well-groomed.    LABS AND IMAGES:    PSA trend, see oncology history.      PSMA PET from [facility].  Reviewed personally.  Diffuse uptake. SUV not available, but intense uptake at areas of uptake in upper chest, less intense in spine.  Multiple sites of uptake in neck/chest and spine.      Labs from 2/13/23-WBC 8.4, Hgb 12.1, Plt 231.  LFTs with mild elevation of AST only to 48 and hypoalbuminemia to 3.0.      IMPRESSION AND PLAN:    # metastatic, castration-resistant prostate cancer   Kalin presents today for second opinion regarding possible Pluvicto eligibility for his metastatic castration resistant prostate cancer.  He was initially diagnosed with [place] 5+4 = 9 (4+5 = 9 on [facility] over read) in 2017.  He subsequently underwent radical prostatectomy with Naldo 4+3 equal 7, and positive margin at the apex.  His PSA postop nadired at 0.07, and he subsequently underwent salvage radiation therapy to the prostate bed without androgen deprivation therapy.  From approximately 20 13-20 18 he was followed with Dr. Davidson at the [facility] and he received Casodex which resulted in a negative repeat C18 choline PET on multiple occasions.  He was eventually treated in 2018 with enzalutamide due to development of castration resistant disease.  He was treated with enzalutamide until approximately October 2020 which was discontinued due to rising PSA.  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  There was some question whether either enzalutamide or olaparib may have caused his myocardial infarction and thrombus, however he was co-infected with COVID-19 at the same time in December 2021.  He began docetaxel single agent in April 2022 and continue for 3 cycles.  This was discontinued due to persistent rise of his PSA throughout therapy.  He was then subsequently started on carboplatin and cabazitaxel which resulted in interval improvement of his PSA down to 399, however it began to rise while on therapy as  well.  His most recent PSA is 630 as of the first week of November 2022.  He underwent a PSMA PET at [facility] which was notable for diffuse PSMA avid disease, particularly in the neck, chest, and some spinal locations as well.  He meets the requirements to receive Pluvicto in terms of prior therapy as he has been treated with enzalutamide, and to taxanes.  His blood counts are adequate with an ANC most recently of 5.7 on 11/4/2022 at [facility].  Platelets at the same time were 270 and hemoglobin was 10.7.  His renal function was at baseline at 0.81 for creatinine and his LFTs were all within normal limits.    We spent most of the today's appointment discussing eligibility for Pluvicto, and the rationale for treatment with Pluvicto.  We discussed that Pluvicto is a radioligand targeting PSMA expressed on the surface of prostate cancer cells.  We discussed that not all prostate cancer cells express PSMA therefore there is a need for PSMA PET evaluation, which she has completed.  We discussed that his PSA level is relatively high although he is eligible for Pluvicto, he may not see as remarkable of benefit as some patients in the VISION trial which led to FDA approval in [place] had.  He does seem to have a high level of PSMA avidity which typically results in a better response, however I do not have the mean SUV available from the pushed imaging studies.  There is no minimum SUV requirement, although patients with a mean SUV of greater than 20 typically experience better responses according to literature.  We discussed the predominant side effects of Pluvicto, including nausea, vomiting, fatigue, cytopenias including thrombocytopenia, anemia and neutropenia.  We discussed that there is a rare but serious side effect of bone marrow failure with Pluvicto use.  We also discussed that patients may experience on target off organ effect due to PSM expression and lacrimal, salivary glands resulting in  significant dry mouth that is not relieved with any available therapies.  We discussed that this is typically in patients receiving later doses, often 4-6 but can happen at any time.    He received his first dose of Pluvicto after bridging with oral cytoxan due to the prolonged delay of ~8 weeks to get his first radioligand therapy dose.  His PSA had continued climbing and accelerated just before dose #1 which was given on 1/16/23.  He tolerated dose #1 well without nausea.  At baseline he is very fatigued.  No dry mouth or dry eyes after cycle 1.  His performance status at our visit on 2/20/23 is approaching a 3 and he has new O2 needs.  He has a history of PE and pulmonary asbestosis.  He is anticoagulated on warfarin with his last INR supratherapeutic at 3.5.  He was evaluated in the ED in [place] and discharged.  I unfortunately don't have these records  He started his prior Rx of home O2 on return home.  I think a PE is less likely than worsening of his prostate cancer versus his existing pulmonary disease from asbestos exposure.  We discussed my concerns with his worsening performance status and Kalin still wished to proceed with Dose #2 of Pluvicto.  I also discussed my concern regarding the lack of a response after dose #1, although the rate of increase did decline slightly.  I would be hesitant to give a third dose in his condition if I do not see a response 4 weeks after dose #2 is given.  We will address this at that time, but I will plan to see him 4 weeks after dose #2 rather than 5 weeks in order to evaluate his condition and labs.  He and his wife are aware of my concern that Dose #2 is pretty unlikely to affect his PSA significantly, but very much want to try this next dose.  I have notified my RNCC regarding his wish to stay at [facility] the evening prior and this coordination is in process.      I have also communicated with his primary oncologist, Dr. Romero, regarding my concerns about his  rising PSA.  Oral cytoxan could be resumed if Pluvicto doesn't work, or consider transition to hospice given his rapidly rising PSA in the thousands.  We will see what our next evaluation brings.      PLAN:  1. I have talked to Carolyn and she is working on getting you a slot at [employer] [employer] [facility] for Arturo night.    2. We will proceed with your dose #2 of Pluvicto as scheduled.   3. See me back by video visit on 3/27, which is two weeks before your next Pluvicto dose so we can evaluate your response and make a decision together on dose #3.      A total of 80 minutes were spent on this patient on the day of the encounter, of which more than 50% of this time was used for counseling and coordination of care.  The patient and were given the opportunity to ask multiple questions today, all of which were answered to their satisfaction.        Again, thank you for allowing me to participate in the care of your patient.      Sincerely,    Raj Blas MD

## 2023-02-20 NOTE — PATIENT INSTRUCTIONS
Kalin,   It was good to see you today.  I am very worried that the Pluvicto is not working and will likely not work even with additional doses.  We can proceed with Dose #2 as planned to see what happens, but it is important for you to know that if we are not seeing any response after 2 doses, there is next to no chance that a third dose or beyond would be helpful.      I have talked to Carolyn and she is working on getting you a slot at [employer] [employer] for Arturo night.    We will proceed with your dose #2 of Pluvicto as scheduled.   See me back by video visit on 3/27, which is two weeks before your next Pluvicto dose so we can evaluate your response and make a decision together on dose #3.      Please ask if additional questions arise.      Dr. Blas

## 2023-02-20 NOTE — PROGRESS NOTES
Video-Visit Details    Type of service:  Video Visit    Video Start Time (time video started): 1:05    Video End Time (time video stopped): 1:37    Originating Location (pt. Location): Home     Distant Location (provider location):  On-site    Mode of Communication:  Video Conference via [employer]

## 2023-02-20 NOTE — NURSING NOTE
Is the patient currently in the state of [place]? YES    Visit mode:VIDEO    If the visit is dropped, the patient can be reconnected by: VIDEO VISIT: Text to cell phone: [phone]    Will anyone else be joining the visit? NO      How would you like to obtain your AVS? MyChart    Are changes needed to the allergy or medication list? NO    Comments or concerns regarding today's visit: no

## 2023-02-21 NOTE — PROGRESS NOTES
[facility] Medical Oncology Follow-up Visit Note       Date of visit: February 20, 2023    CC: Follow-up after C1 Pluvicto, pending C2 Pluvicto.      Interval History: He is not feeling great.  He was seen in the ED for hypoxia and started himself back on his home O2 at 2LPM.  He is feeling somewhat short of breath and really isn't able to be active at all.  No pain currently.  Appetite has been OK.  Is wondering why his PSA isn't going down after dose #1 of Pluvicto, but still very interested in proceeding with dose #2.  No nausea with dose #1.  Fatigue is significant already so didn't notice an increase in this.  No dry mouth or dry eyes reported.      ONCOLOGY HISTORY (Adapted from [facility] notes):  11/13/06-PSA 7.1.  No prior.    11/30/06-Transrectal bx with no evidence of malignancy in 6 cores.   4/30/07-PSA 6.95  5/17/07-Transrectal bx with Naldo 5+4=9 on left.  Overread from N 4+5=9.    7/2/07-Radical prostatectomy.  Bilateral involvement with Naldo 4+3=7, tertiary 5.  Margin+ at apex.  R SVI. 1/5 nodes positive.    9/17/07-Post op PSA 0.07  10/11/07-Salvage XRT 47.5 Gy in surgical bed, 9.5 Gy to pelvic nodes.  3 months lupron.   2/08-PSA 0.01  2/09-PSA 0.09  11/09-PSA 0.25  11/20-PSA 0.50  5/11-PSA 0.89  4/12-PSA 1.7  1/13-PSA 3.7  3/7/13-Transfer to Dr. Davidson at [facility].  PSA 5.4. C-11 Choline PET with met at L1.  ADT with casodex.  SBRT to bone met declined.    9/12/13-PSA 0.3 on ADT alone  11/19/15-PSA unknown.  Repeat PET negative.   2/16-PSA 0.07   1/17-PSA 0.15  4/17-Repeat PET still negative   8/17-Transfer care back to [facility].    11/17-PSA 0.26  1/18-PSA 0.43  5/18-PSA 0.69  8/18-PSA 0.75, castration resistant disease  8/16/18-Start enzalutamide.  PSADT <6 months.    2/13/19-PSA bruno of 0.31 on enzalutamide  10/2020-Stop enzalutamide.  PSA 2.26  9/21-Start olaparib.  10/12/21-PSA 59.01  11/9/21-PSA 61.87  1/4/22-.14  2/1/22-.45  3/1/22-PSA  187.18  3/29/22-Stop olaparib.  No response.   4/26/22-Start docetaxel. .23. Minimal PSA response. Marty of 443 at C3D1.  6/28/22-Last cycle of docetaxel (3rd). Stopped due to rising PSA of 628.21.   7/2022-Start Carboplatin/cabazitaxel.  7/22/22-  8/12/22-  9/2/22-  9/23/22-  10/14/22-.03-C6 carbo/cabazitaxel  11/7/22-Second opinion at [facility] for Pluvicto.  PSA at [facility] week prior was 650.  12/2/22-  12/28/22-  1/9/23-  1/17/23-Pluvicto Dose #1.   1/23/23-PSA 1589  2/13/23-PSA 1746  2/20/23-Video visit prior to C2 Pluvicto.  More short of breath and on home O2 again (note prior rx for this over a year ago).  Discussed my concerns that the PSA is not going down, although trend is slowing down a bit.  We will have to see how he is doing prior to dose #3 and if PSA is going down.  If not declining after 2 doses, it is unlikely that additional doses will provide a benefit.      GENOMIC STUDIES:   CDK12 frameshift.      TREATMENT HISTORY:   ADT   Carbo (AUC 2) /cabazitaxel 20mg/m2 for 6 total doses (last 10/14/22)  olaparib 9/20/21-3/29/22  Enzalutamide 8/2018-10/2020  Docetaxel 4/26/22-6/28/22  zometa  Oral cytoxan    GUIDELINES USED: NCCN    INTENT OF THERAPY:  Palliative.  Discussed at 11/7/2022 appointment.      CLINICAL TRIALS:   -KLK2-CART: no slots until at least late January at earliest for apheresis.   -PSMA-CD3: pending open, eligible.   -Astrin CTC: eligible, will discuss on 11/7/22  -NATHAN: ineligible, 2 taxanes in [place]  -ECLIPSE: [facility] with prior bevrely and taxane x2.   -PSMA ADDITION: Pending opening, [facility] already.      PMH:  Urinary retention  afib with RVR  CAD  History of NSTEMI  HLD  Osteoarthritis  Vertigo   Osteoarthritis of hip  Asbestos pleural plaques   Metastatic, castration-resistant prostate cancer   Pulmonary embolism, no chronic warfarin     PSH:  Radical prostatecomy 2007-[facility]     FAMILY HX:   No known family numbers  with prostate cancer.    SOCIAL:   Former smoker, 15 PYH.  1 PPD x15 years.  Quit 11/13/76.   Never smokeless tobacco user.   No EtOH  No recreational drugs.   Herbs/supplements-none other than on medication list.  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    ALLERGIES: IV contrast, requiring premedication.    MEDS:  Warfarin  Albuterol nebulizer Q6H PRN dyspnea  Nitroglycerin 0.4mg PRN chest pain  Multivitamin daily   Vitamin D 1000 units daily   Atorvastatin 40mg daily   toprol XL 100mg daily     ROS-Remainder of 14 point ROS reviewed and negative except as in HPI.    PHYSICAL EXAM:  ECOG-PS=1    There were no vitals taken for this visit.  GENERAL: alert and no distress  EYES: Eyes grossly normal to inspection.  No discharge or erythema, or obvious scleral/conjunctival abnormalities.  NECK: No asymmetry, visible masses or scars  RESP: No audible wheeze, cough, or visible cyanosis.  No visible retractions or increased work of breathing. On nasal cannula.    SKIN: Visible skin clear. No significant rash, abnormal pigmentation.  Bilateral forearms with ecchymoses in various states of healing.    NEURO: Cranial nerves grossly intact.  Mentation and speech appropriate for age.  PSYCH: Mentation appears normal, affect normal/bright, judgement and insight intact, normal speech and appearance well-groomed.    LABS AND IMAGES:    PSA trend, see oncology history.     PSMA PET from [facility].  Reviewed personally.  Diffuse uptake. SUV not available, but intense uptake at areas of uptake in upper chest, less intense in spine.  Multiple sites of uptake in neck/chest and spine.      Labs from 2/13/23-WBC 8.4, Hgb 12.1, Plt 231.  LFTs with mild elevation of AST only to 48 and hypoalbuminemia to 3.0.      IMPRESSION AND PLAN:    # metastatic, castration-resistant prostate cancer   Kalin presents today for second opinion regarding possible Pluvicto eligibility for his metastatic castration resistant prostate cancer.   He was initially diagnosed with Naldo 5+4 = 9 (4+5 = 9 on [facility] over read) in 2017.  He subsequently underwent radical prostatectomy with Naldo 4+3 equal 7, and positive margin at the apex.  His PSA postop nadired at 0.07, and he subsequently underwent salvage radiation therapy to the prostate bed without androgen deprivation therapy.  From approximately 20 13-20 18 he was followed with Dr. Davidson at the [facility] and he received Casodex which resulted in a negative repeat C18 choline PET on multiple occasions.  He was eventually treated in 2018 with enzalutamide due to development of castration resistant disease.  He was treated with enzalutamide until approximately October 2020 which was discontinued due to rising PSA.  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  There was some question whether either enzalutamide or olaparib may have caused his myocardial infarction and thrombus, however he was co-infected with COVID-19 at the same time in December 2021.  He began docetaxel single agent in April 2022 and continue for 3 cycles.  This was discontinued due to persistent rise of his PSA throughout therapy.  He was then subsequently started on carboplatin and cabazitaxel which resulted in interval improvement of his PSA down to 399, however it began to rise while on therapy as well.  His most recent PSA is 630 as of the first week of November 2022.  He underwent a PSMA PET at [facility] which was notable for diffuse PSMA avid disease, particularly in the neck, chest, and some spinal locations as well.  He meets the requirements to receive Pluvicto in terms of prior therapy as he has been treated with enzalutamide, and to taxanes.  His blood counts are adequate with an ANC most recently of 5.7 on 11/4/2022 at [facility].  Platelets at the same time were 270 and hemoglobin was 10.7.  His renal function was at baseline at 0.81  for creatinine and his LFTs were all within normal limits.    We spent most of the today's appointment discussing eligibility for Pluvicto, and the rationale for treatment with Pluvicto.  We discussed that Pluvicto is a radioligand targeting PSMA expressed on the surface of prostate cancer cells.  We discussed that not all prostate cancer cells express PSMA therefore there is a need for PSMA PET evaluation, which she has completed.  We discussed that his PSA level is relatively high although he is eligible for Pluvicto, he may not see as remarkable of benefit as some patients in the VISION trial which led to FDA approval in [place] had.  He does seem to have a high level of PSMA avidity which typically results in a better response, however I do not have the mean SUV available from the pushed imaging studies.  There is no minimum SUV requirement, although patients with a mean SUV of greater than 20 typically experience better responses according to literature.  We discussed the predominant side effects of Pluvicto, including nausea, vomiting, fatigue, cytopenias including thrombocytopenia, anemia and neutropenia.  We discussed that there is a rare but serious side effect of bone marrow failure with Pluvicto use.  We also discussed that patients may experience on target off organ effect due to PSM expression and lacrimal, salivary glands resulting in significant dry mouth that is not relieved with any available therapies.  We discussed that this is typically in patients receiving later doses, often 4-6 but can happen at any time.    He received his first dose of Pluvicto after bridging with oral cytoxan due to the prolonged delay of ~8 weeks to get his first radioligand therapy dose.  His PSA had continued climbing and accelerated just before dose #1 which was given on 1/16/23.  He tolerated dose #1 well without nausea.  At baseline he is very fatigued.  No dry mouth or dry eyes after cycle 1.  His performance  status at our visit on 2/20/23 is approaching a 3 and he has new O2 needs.  He has a history of PE and pulmonary asbestosis.  He is anticoagulated on warfarin with his last INR supratherapeutic at 3.5.  He was evaluated in the ED in [place] and discharged.  I unfortunately don't have these records  He started his prior Rx of home O2 on return home.  I think a PE is less likely than worsening of his prostate cancer versus his existing pulmonary disease from asbestos exposure.  We discussed my concerns with his worsening performance status and Kalin still wished to proceed with Dose #2 of Pluvicto.  I also discussed my concern regarding the lack of a response after dose #1, although the rate of increase did decline slightly.  I would be hesitant to give a third dose in his condition if I do not see a response 4 weeks after dose #2 is given.  We will address this at that time, but I will plan to see him 4 weeks after dose #2 rather than 5 weeks in order to evaluate his condition and labs.  He and his wife are aware of my concern that Dose #2 is pretty unlikely to affect his PSA significantly, but very much want to try this next dose.  I have notified my RNCC regarding his wish to stay at [facility] the evening prior and this coordination is in process.      I have also communicated with his primary oncologist, Dr. Romero, regarding my concerns about his rising PSA.  Oral cytoxan could be resumed if Pluvicto doesn't work, or consider transition to hospice given his rapidly rising PSA in the thousands.  We will see what our next evaluation brings.      PLAN:  1. I have talked to Carolyn and she is working on getting you a slot at [facility] for Arturo night.    2. We will proceed with your dose #2 of Pluvicto as scheduled.   3. See me back by video visit on 3/27, which is two weeks before your next Pluvicto dose so we can evaluate your response and make a decision together on dose #3.      A total of 80 minutes were  spent on this patient on the day of the encounter, of which more than 50% of this time was used for counseling and coordination of care.  The patient and were given the opportunity to ask multiple questions today, all of which were answered to their satisfaction.    Raj Blas MD, PhD   of Medicine   Oncology/BMT/Cellular Therapies